# Patient Record
Sex: FEMALE | Race: BLACK OR AFRICAN AMERICAN | NOT HISPANIC OR LATINO | Employment: PART TIME | URBAN - METROPOLITAN AREA
[De-identification: names, ages, dates, MRNs, and addresses within clinical notes are randomized per-mention and may not be internally consistent; named-entity substitution may affect disease eponyms.]

---

## 2021-05-04 ENCOUNTER — HOSPITAL ENCOUNTER (EMERGENCY)
Facility: CLINIC | Age: 36
Discharge: HOME | End: 2021-05-05
Attending: EMERGENCY MEDICINE | Admitting: EMERGENCY MEDICINE
Payer: COMMERCIAL

## 2021-05-04 VITALS
RESPIRATION RATE: 18 BRPM | BODY MASS INDEX: 36.7 KG/M2 | WEIGHT: 215 LBS | SYSTOLIC BLOOD PRESSURE: 115 MMHG | DIASTOLIC BLOOD PRESSURE: 77 MMHG | TEMPERATURE: 98.3 F | OXYGEN SATURATION: 99 % | HEART RATE: 81 BPM | HEIGHT: 64 IN

## 2021-05-04 DIAGNOSIS — K08.89 PAIN, DENTAL: ICD-10-CM

## 2021-05-04 PROCEDURE — 99282 EMERGENCY DEPT VISIT SF MDM: CPT

## 2021-05-04 ASSESSMENT — MIFFLIN-ST. JEOR: SCORE: 1645.23

## 2021-05-05 RX ORDER — OXYCODONE HYDROCHLORIDE 5 MG/1
5 TABLET ORAL EVERY 6 HOURS PRN
Qty: 6 TABLET | Refills: 0 | Status: SHIPPED | OUTPATIENT
Start: 2021-05-05 | End: 2022-08-06

## 2021-05-05 ASSESSMENT — ENCOUNTER SYMPTOMS
COUGH: 0
FEVER: 0

## 2021-05-05 NOTE — ED TRIAGE NOTES
Had root canal done on lower left tooth today. Patient reports severe pain. Took 400mg ibuprofen and 1000mg tylenol at 21:30 tonight with no relief. States they did not give her any abx or pain medication to go home with after root canal.

## 2021-05-05 NOTE — ED NOTES
Bed: ED25  Expected date:   Expected time:   Means of arrival:   Comments:  triage   Pt. Repos/resettled in bed.

## 2021-05-05 NOTE — ED PROVIDER NOTES
"  History   Chief Complaint:  Dental Pain     HPI   Leonardo Mahan is a 35 year old female with history of migraines who presents with dental pain. The patient reports that he had a root canal done from 8250-1114 on his lower left tooth today and is having pain in the same area currently. She took 400 mg of ibuprofen and 1000 mg of Tylenon without any relief. She states that she was not given any pain medication or antibiotics by her dentist. The patient's pain initially began 4 days ago and radiates to her head, ear and tonsil. She mentions having some nausea, but notes she has been taking a lot of pain medications since her pain began. She denies any fever, cough or other symptoms at this time.      Review of Systems   Constitutional: Negative for fever.   HENT: Positive for dental problem.    Respiratory: Negative for cough.    All other systems reviewed and are negative.      Allergies:  The patient has no known allergies.     Medications:  The patient is not currently taking any prescribed medications.    Past Medical History:    Iron deficiency anemia  Migraine  Refractive amblyopia     Past Surgical History:    Tonsillectomy  Cholecystectomy    Family History:    Father: diabetes     Social History:  The patient presents alone.     Physical Exam     Patient Vitals for the past 24 hrs:   BP Temp Temp src Pulse Resp SpO2 Height Weight   05/04/21 2349 115/77 98.3  F (36.8  C) Oral 81 18 99 % 1.626 m (5' 4\") 97.5 kg (215 lb)       Physical Exam  General: Appears well-developed and well-nourished.   Head: No signs of trauma.   Mouth/Throat: Oropharynx is clear and moist. Pain at left lower molars.  No facial or bucal swelling.  Tissues under tongue soft.  Posterior oropharynx clear and no swelling.  Eyes: Conjunctivae are normal.   Neck: Normal range of motion. No nuchal rigidity. No cervical adenopathy  CV: Normal rate and regular rhythm.    Resp: Effort normal and breath sounds normal. No respiratory distress. "   MSK: Normal range of motion.   Neuro: The patient is alert and oriented. Speech normal.  Skin: Skin is warm and dry. No rash noted.   Psych: normal mood and affect. behavior is normal.       Emergency Department Course       Emergency Department Course:    Reviewed:  I reviewed nursing notes, vitals, past medical history and care everywhere    Assessments:  0006 I obtained history and examined the patient as noted above.   0025 I rechecked the patient and intended to perform a dental block, but the patient changed her mind and declined this.     Disposition:  The patient was discharged to home.       Impression & Plan     Medical Decision Making:  Leonardo Mahan is a 35-year-old woman who presents due to dental pain.  She had a root canal done this afternoon and states that she has had continued pain despite taking Tylenol and ibuprofen.  On my evaluation there is no facial swelling no signs of Berlin's angina.  I did offer to do a dental block, but the patient declined this.  I did agree to give her a prescription for a few oxycodone and stressed the importance of following up with her dentist in the morning for continued management.  Patient had driven herself here, so I did not give any opiates while she was in the emergency department.      Diagnosis:    ICD-10-CM    1. Pain, dental  K08.89        Discharge Medications:  New Prescriptions    OXYCODONE (ROXICODONE) 5 MG TABLET    Take 1 tablet (5 mg) by mouth every 6 hours as needed for pain       Scribe Disclosure:  I, Oziel Saraviay, am serving as a scribe at 11:57 PM on 5/4/2021 to document services personally performed by Andres Chairez MD based on my observations and the provider's statements to me.              Andres Chairez MD  05/05/21 [id number]

## 2021-05-07 ENCOUNTER — HOSPITAL ENCOUNTER (EMERGENCY)
Facility: CLINIC | Age: 36
Discharge: LEFT BEFORE DISCHARGE | End: 2021-05-07
Payer: COMMERCIAL

## 2021-05-07 VITALS
SYSTOLIC BLOOD PRESSURE: 135 MMHG | TEMPERATURE: 97.2 F | OXYGEN SATURATION: 99 % | HEART RATE: 125 BPM | DIASTOLIC BLOOD PRESSURE: 92 MMHG | RESPIRATION RATE: 16 BRPM

## 2021-05-07 NOTE — ED TRIAGE NOTES
Patient was here for dental pain 5/4, she was advised to call her dentist about an infection and they put her on amoxicillin and she broke out in the hives from it and now her ain is out of control again.  Pain is in her left lower jaw into her neck and head.  She had a root cannel done and she thinks it is infected.    MD note from 5/4   Leonardo Mahan is a 35 year old female with history of migraines who presents with dental pain. The patient reports that he had a root canal done from 7563-5408 on his lower left tooth today and is having pain in the same area currently. She took 400 mg of ibuprofen and 1000 mg of Tylenon without any relief. She states that she was not given any pain medication or antibiotics by her dentist. The patient's pain initially began 4 days ago and radiates to her head, ear and tonsil. She mentions having some nausea, but notes she has been taking a lot of pain medications since her pain began. She denies any fever, cough or other symptoms at this time.

## 2022-08-06 ENCOUNTER — HOSPITAL ENCOUNTER (EMERGENCY)
Facility: CLINIC | Age: 37
Discharge: HOME | End: 2022-08-06
Attending: EMERGENCY MEDICINE | Admitting: EMERGENCY MEDICINE
Payer: COMMERCIAL

## 2022-08-06 VITALS
OXYGEN SATURATION: 100 % | DIASTOLIC BLOOD PRESSURE: 75 MMHG | BODY MASS INDEX: 38.45 KG/M2 | RESPIRATION RATE: 16 BRPM | HEIGHT: 63 IN | WEIGHT: 217 LBS | HEART RATE: 65 BPM | SYSTOLIC BLOOD PRESSURE: 112 MMHG | TEMPERATURE: 97.7 F

## 2022-08-06 DIAGNOSIS — S16.1XXA CERVICAL STRAIN, INITIAL ENCOUNTER: ICD-10-CM

## 2022-08-06 PROCEDURE — 250N000013 HC RX MED GY IP 250 OP 250 PS 637: Performed by: EMERGENCY MEDICINE

## 2022-08-06 PROCEDURE — 99283 EMERGENCY DEPT VISIT LOW MDM: CPT

## 2022-08-06 RX ORDER — OXYCODONE HYDROCHLORIDE 5 MG/1
5 TABLET ORAL EVERY 6 HOURS PRN
Qty: 10 TABLET | Refills: 0 | Status: SHIPPED | OUTPATIENT
Start: 2022-08-06 | End: 2022-08-09

## 2022-08-06 RX ORDER — OXYCODONE HYDROCHLORIDE 5 MG/1
10 TABLET ORAL ONCE
Status: COMPLETED | OUTPATIENT
Start: 2022-08-06 | End: 2022-08-06

## 2022-08-06 RX ORDER — CYCLOBENZAPRINE HCL 5 MG
5-10 TABLET ORAL 3 TIMES DAILY PRN
Qty: 15 TABLET | Refills: 0 | Status: SHIPPED | OUTPATIENT
Start: 2022-08-06

## 2022-08-06 RX ORDER — CYCLOBENZAPRINE HCL 10 MG
10 TABLET ORAL ONCE
Status: COMPLETED | OUTPATIENT
Start: 2022-08-06 | End: 2022-08-06

## 2022-08-06 RX ADMIN — OXYCODONE HYDROCHLORIDE 10 MG: 5 TABLET ORAL at 09:28

## 2022-08-06 RX ADMIN — CYCLOBENZAPRINE 10 MG: 10 TABLET, FILM COATED ORAL at 09:28

## 2022-08-06 ASSESSMENT — ENCOUNTER SYMPTOMS
MYALGIAS: 1
NUMBNESS: 0
NECK PAIN: 1
NECK STIFFNESS: 1

## 2022-08-06 NOTE — ED PROVIDER NOTES
"  History   Chief Complaint:  Neck Pain    The history is provided by the patient.      Leonardo Mahan is a 36 year old female with history of migraine and iron deficiency anemia who presents with neck pain. The patient reports experiencing neck pain for the past two weeks. States she woke up today with pain originating in the left side of her neck and radiating down to her left upper arm. She notes limited range of motion when looking to her left. Reports that stretching has helped alleviate her pain in the past. States that she took a hot shower and 1000 mg Tylenol this morning. 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 Denies numbness or tingling in arms. Denies trauma or injury.    Review of Systems   Musculoskeletal: Positive for myalgias, neck pain and neck stiffness.   Neurological: Negative for numbness.   All other systems reviewed and are negative.    Allergies:  Amoxicillin    Medications:  Oxycodone     Past Medical History:     Iron deficiency anemia  Migraine  Refractive amblyopia  Vitamin D deficiency  Morbid obesity  Hyperopia  Astigmatism     Past Surgical History:    Laparoscopic cholecystectomy  Tonsillectomy     Family History:    Father- type II diabetes, obesity    Social History:  Presents with family  Presents via private vehicle     Physical Exam     Patient Vitals for the past 24 hrs:   BP Temp Temp src Pulse Resp SpO2 Height Weight   08/06/22 1130 112/75 -- -- 65 -- 100 % -- --   08/06/22 0900 117/68 97.7  F (36.5  C) Temporal 74 16 100 % 1.6 m (5' 3\") 98.4 kg (217 lb)     Physical Exam  General: Alert, No distress. Nontoxic appearance.  Head: No signs of trauma.  Mouth/Throat: Oropharynx moist.  Eyes: Conjunctivae are normal. Pupils are equal.  Neck: Normal range of motion. Posterior lateral aspect of the cervical spine tenderness to palpation.  CV: Appears well perfused.  Resp:No respiratory distress.  MSK: Normal range of motion. No " obvious deformity.  strength and pulse equal in arms.  Neuro: The patient is alert and interactive. RUIZ. Speech normal. GCS 15  Skin: No lesion or sign of trauma noted.  Psych: normal mood and affect. behavior is normal.    Emergency Department Course   Emergency Department Course:  Reviewed:  I reviewed nursing notes, vitals, past medical history and Care Everywhere    Assessments:  0918 I obtained history and examined the patient as noted above.  1106 I rechecked and updated the patient. The patient works at [employer] and so she will find a neck specialist to follow up.     Interventions:  0928 Cyclobenzaprine, 10 mg, PO  0928 Oxycodone, 10 mg, PO    Disposition:  The patient was discharged to home.     Impression & Plan   Medical Decision Making:  Leonardo Mahan is a 36 year old female who presents for evaluation of neck pain. There is mild radiculopathy. Clinical examination is consistent with muscle spasm.    I doubt fracture, ligamentous instability, myelopathy, dissection, spinal tumor or abscess at this time. I do not feel there are other worrisome etiologies at this time to prompt CT/MRI of neck/spine. I discussed worrisome symptoms/signs, if they were to evolve, that should prompt the patient to follow up more quickly or return to the ED.  There are no red flag symptoms to suggest we need further workup or advanced imaging at this point.   Supportive outpatient management is indicated. Discussed radiculopathy, causes and treatments.  Discussed that this does not mean they necessarily need an MRI but they do need close f/u of primary.  Decided on pain control and muscle spasm control.     Diagnosis:    ICD-10-CM    1. Cervical strain, initial encounter  S16.1XXA        Discharge Medication List as of 8/6/2022 11:32 AM      START taking these medications    Details   cyclobenzaprine (FLEXERIL) 5 MG tablet Take 1-2 tablets (5-10 mg) by mouth 3 times daily as needed for muscle spasms, Disp-15 tablet, R-0, Local  Print           Discharge Medication List as of 8/6/2022 11:32 AM      CONTINUE these medications which have CHANGED    Details   oxyCODONE (ROXICODONE) 5 MG tablet Take 1 tablet (5 mg) by mouth every 6 hours as needed for pain, Disp-10 tablet, R-0, Local Print               Scribe Disclosure:  RAINER, Mali Lee, am serving as a scribe at 9:07 AM on 8/6/2022 to document services personally performed by Manpreet Ponce MD based on my observations and the provider's statements to me.      Manpreet Ponce MD  08/06/22 [id number]

## 2022-08-06 NOTE — ED TRIAGE NOTES
Neck pain for the past 2 wks, today radiating to lt arm, no numbness or tingling, no injury     Triage Assessment     Row Name 08/06/22 0902       Triage Assessment (Adult)    Airway WDL WDL       Respiratory WDL    Respiratory WDL WDL       Cardiac WDL    Cardiac WDL WDL       Cognitive/Neuro/Behavioral WDL    Cognitive/Neuro/Behavioral WDL WDL

## 2022-08-06 NOTE — ED NOTES
Patient denies weakness, numbness and tingling. C/o pain that increases with any neck movement in LUE